# Patient Record
Sex: MALE | Race: WHITE | NOT HISPANIC OR LATINO | URBAN - NONMETROPOLITAN AREA
[De-identification: names, ages, dates, MRNs, and addresses within clinical notes are randomized per-mention and may not be internally consistent; named-entity substitution may affect disease eponyms.]

---

## 2024-03-25 PROBLEM — E78.5 HYPERLIPIDEMIA: Status: ACTIVE | Noted: 2024-03-25

## 2024-03-25 PROBLEM — E83.119 HEMOCHROMATOSIS: Status: ACTIVE | Noted: 2024-03-25

## 2024-03-25 PROBLEM — I25.10 CAD (CORONARY ARTERY DISEASE): Status: ACTIVE | Noted: 2024-03-25

## 2024-03-25 PROBLEM — I10 HYPERTENSION: Status: ACTIVE | Noted: 2024-03-25

## 2024-03-25 RX ORDER — LISINOPRIL AND HYDROCHLOROTHIAZIDE 12.5; 2 MG/1; MG/1
1 TABLET ORAL DAILY
COMMUNITY
Start: 2022-06-07

## 2024-03-25 RX ORDER — ASPIRIN 325 MG
1 TABLET, DELAYED RELEASE (ENTERIC COATED) ORAL DAILY
COMMUNITY

## 2024-03-25 RX ORDER — ASPIRIN 81 MG/1
1 TABLET ORAL DAILY
COMMUNITY

## 2024-03-25 RX ORDER — METOPROLOL TARTRATE 50 MG/1
1 TABLET ORAL EVERY 12 HOURS
COMMUNITY

## 2024-03-25 RX ORDER — OMEPRAZOLE 40 MG/1
40 CAPSULE, DELAYED RELEASE ORAL
COMMUNITY

## 2024-03-25 RX ORDER — ATORVASTATIN CALCIUM 20 MG/1
1 TABLET, FILM COATED ORAL NIGHTLY
COMMUNITY

## 2024-08-01 ENCOUNTER — APPOINTMENT (OUTPATIENT)
Dept: CARDIOLOGY | Facility: CLINIC | Age: 79
End: 2024-08-01
Payer: MEDICARE

## 2024-08-01 VITALS
BODY MASS INDEX: 25.77 KG/M2 | SYSTOLIC BLOOD PRESSURE: 138 MMHG | DIASTOLIC BLOOD PRESSURE: 82 MMHG | WEIGHT: 174 LBS | HEIGHT: 69 IN | HEART RATE: 90 BPM

## 2024-08-01 DIAGNOSIS — E78.2 MIXED HYPERLIPIDEMIA: ICD-10-CM

## 2024-08-01 DIAGNOSIS — I10 PRIMARY HYPERTENSION: ICD-10-CM

## 2024-08-01 DIAGNOSIS — I25.10 CORONARY ARTERY DISEASE INVOLVING NATIVE CORONARY ARTERY OF NATIVE HEART WITHOUT ANGINA PECTORIS: Primary | ICD-10-CM

## 2024-08-01 PROCEDURE — 1036F TOBACCO NON-USER: CPT | Performed by: NURSE PRACTITIONER

## 2024-08-01 PROCEDURE — 3075F SYST BP GE 130 - 139MM HG: CPT | Performed by: NURSE PRACTITIONER

## 2024-08-01 PROCEDURE — 3079F DIAST BP 80-89 MM HG: CPT | Performed by: NURSE PRACTITIONER

## 2024-08-01 PROCEDURE — 1160F RVW MEDS BY RX/DR IN RCRD: CPT | Performed by: NURSE PRACTITIONER

## 2024-08-01 PROCEDURE — 1159F MED LIST DOCD IN RCRD: CPT | Performed by: NURSE PRACTITIONER

## 2024-08-01 PROCEDURE — 99213 OFFICE O/P EST LOW 20 MIN: CPT | Performed by: NURSE PRACTITIONER

## 2024-08-01 RX ORDER — LISINOPRIL AND HYDROCHLOROTHIAZIDE 12.5; 2 MG/1; MG/1
1 TABLET ORAL DAILY
Qty: 90 TABLET | Refills: 3 | Status: SHIPPED | OUTPATIENT
Start: 2024-08-01 | End: 2025-08-01

## 2024-08-01 ASSESSMENT — ENCOUNTER SYMPTOMS
PND: 0
ORTHOPNEA: 0
NEAR-SYNCOPE: 0
IRREGULAR HEARTBEAT: 0
PALPITATIONS: 0
DYSPNEA ON EXERTION: 0
SYNCOPE: 0

## 2024-08-01 NOTE — PROGRESS NOTES
"Chief Complaint  \" So far so good\"    Reason for Visit  Annual follow-up  Patient presents to the office today for outpatient follow-up for coronary artery disease  Last evaluated in clinic by Dr. Smith August 2023    Presents today ambulatory with steady gait.  Accompanied by patient  Patient denies any hospitalizations or significant changes to interval medical history since last office follow-up.   He follows routinely with PCP and hematology due to hemochromatosis.    History of Present Illness   Patient is an extremely pleasant 79-year-old gentleman who presents to the office today with no voiced cardiovascular complaints.  He remains aerobically active mowing his grass with a self-reported lawn more, [place].  He denies any exertional symptoms, ambulated in the parking lot without concerns.    Patient reports that overall has no complaint(s) of chest pain, chest pressure/discomfort, claudication, dyspnea, exertional chest pressure/discomfort, fatigue, and irregular heart beat    Daily activity:    Rides his bicycle  Denies any change in exercise capacity or functional tolerance since last office visit.    The importance of secondary prevention reviewed:  HTN: Optimal  HLD: Treated  DM: Denies  Smoker: Denies  BMI:  Reviewed the merits of healthy lifestyle choices on overall cardiovascular health.    Overall patient is pleased with current state of cardiovascular health.  At this time there are no indications for additional cardiovascular testing or need for medication changes.     Review of Systems   Cardiovascular:  Negative for chest pain, dyspnea on exertion, irregular heartbeat, leg swelling, near-syncope, orthopnea, palpitations, paroxysmal nocturnal dyspnea and syncope.        Visit Vitals  /82 (BP Location: Left arm, Patient Position: Sitting)   Pulse 90   Ht 1.753 m (5' 9\")   Wt 78.9 kg (174 lb)   BMI 25.70 kg/m²   Smoking Status Former   BSA 1.96 m² "     Physical Exam  Vitals and nursing note reviewed.   Constitutional:       Appearance: Normal appearance.   Cardiovascular:      Rate and Rhythm: Normal rate and regular rhythm.      Heart sounds: Normal heart sounds.   Pulmonary:      Effort: Pulmonary effort is normal.      Breath sounds: Normal breath sounds.   Musculoskeletal:      Cervical back: Full passive range of motion without pain.      Right lower leg: No edema.      Left lower leg: No edema.   Skin:     General: Skin is cool.   Neurological:      Mental Status: He is alert and oriented to person, place, and time.   Psychiatric:         Attention and Perception: Attention normal.         Mood and Affect: Mood normal.         Behavior: Behavior is cooperative.      No Known Allergies    Current Outpatient Medications   Medication Instructions    aspirin 81 mg EC tablet 1 tablet, oral, Daily    atorvastatin (Lipitor) 20 mg tablet 1 tablet, oral, Nightly    cholecalciferol (Vitamin D-3) 50,000 unit capsule 1 capsule, oral, Daily    lisinopriL-hydrochlorothiazide 20-12.5 mg tablet 1 tablet, oral, Daily    metoprolol tartrate (Lopressor) 50 mg tablet 1 tablet, oral, Every 12 hours    omeprazole (PRILOSEC) 40 mg, oral, Daily before breakfast, Do not crush or chew.      Assessment:    CAD (coronary artery disease)  May 2015 cardiac cath  Mid LAD 50%  Circumflex normal  Mid RCA 50 to 70%  LVEF greater than 70%    Current daily activity greater than 4 METS without concerning symptoms    Hyperlipidemia  Treated    Hypertension  Optimal in office    BMI 25.0-25.9,adult  Reviewed the merits of healthy lifestyle choices on overall cardiovascular health.    Plan:     Through informed decision making process incorporating patients unique circumstances, the following treatment plan will be initiated:    1.  Prescription drug management of cardiovascular medication for efficacy, adherence to treatment, side effect assessment and polypharmacy. Current treatment  clinically warranted and to continue without modifications.    2. Return for follow-up; in the interim, contact the office if new symptoms arise.  Dr. Cody annual     Discussed the dynamic nature of coronary artery disease and the importance of seeking medical attention if new symptoms arise.    Abida Martin  MSN, APRN-CNP, PMHNP-BC  [facility]    Please excuse any errors in grammar or translation related to this dictation. Voice recognition software was utilized to prepare this document.

## 2024-08-01 NOTE — ASSESSMENT & PLAN NOTE
May 2015 cardiac cath  Mid LAD 50%  Circumflex normal  Mid RCA 50 to 70%  LVEF greater than 70%    Current daily activity greater than 4 METS without concerning symptoms

## 2024-08-01 NOTE — PATIENT INSTRUCTIONS
Please bring all medicines, vitamins, and herbal supplements with you when you come to the office.    Prescriptions will not be filled unless you are compliant with your follow up appointments or have a follow up appointment scheduled as per instruction of your physician. Refills should be requested at the time of your visit.     PLAN:   Through informed decision making process incorporating patients unique circumstances, the following treatment plan will be initiated:    1.  Prescription drug management of cardiovascular medication for efficacy, adherence to treatment, side effect assessment and polypharmacy. Current treatment clinically warranted and to continue without modifications.    2. Return for follow-up; in the interim, contact the office if new symptoms arise.  Dr. Cody annual

## 2025-08-19 ENCOUNTER — APPOINTMENT (OUTPATIENT)
Dept: CARDIOLOGY | Facility: CLINIC | Age: 80
End: 2025-08-19
Payer: MEDICARE

## 2025-08-19 VITALS
HEART RATE: 66 BPM | WEIGHT: 154 LBS | SYSTOLIC BLOOD PRESSURE: 100 MMHG | BODY MASS INDEX: 22.81 KG/M2 | DIASTOLIC BLOOD PRESSURE: 62 MMHG | HEIGHT: 69 IN

## 2025-08-19 DIAGNOSIS — E78.2 MIXED HYPERLIPIDEMIA: ICD-10-CM

## 2025-08-19 DIAGNOSIS — I10 PRIMARY HYPERTENSION: ICD-10-CM

## 2025-08-19 DIAGNOSIS — Z76.89 ENCOUNTER TO ESTABLISH CARE: ICD-10-CM

## 2025-08-19 DIAGNOSIS — I25.10 CORONARY ARTERY DISEASE INVOLVING NATIVE CORONARY ARTERY OF NATIVE HEART WITHOUT ANGINA PECTORIS: Primary | ICD-10-CM

## 2025-08-19 DIAGNOSIS — E83.119 HEMOCHROMATOSIS, UNSPECIFIED HEMOCHROMATOSIS TYPE: ICD-10-CM

## 2025-08-19 PROCEDURE — 93000 ELECTROCARDIOGRAM COMPLETE: CPT | Performed by: INTERNAL MEDICINE

## 2025-08-19 PROCEDURE — 1160F RVW MEDS BY RX/DR IN RCRD: CPT | Performed by: INTERNAL MEDICINE

## 2025-08-19 PROCEDURE — 1159F MED LIST DOCD IN RCRD: CPT | Performed by: INTERNAL MEDICINE

## 2025-08-19 PROCEDURE — 99214 OFFICE O/P EST MOD 30 MIN: CPT | Performed by: INTERNAL MEDICINE

## 2025-08-19 PROCEDURE — 3074F SYST BP LT 130 MM HG: CPT | Performed by: INTERNAL MEDICINE

## 2025-08-19 PROCEDURE — 3078F DIAST BP <80 MM HG: CPT | Performed by: INTERNAL MEDICINE

## 2025-08-19 RX ORDER — AMLODIPINE BESYLATE 2.5 MG/1
2.5 TABLET ORAL DAILY
COMMUNITY

## 2026-08-18 ENCOUNTER — APPOINTMENT (OUTPATIENT)
Dept: CARDIOLOGY | Facility: CLINIC | Age: 81
End: 2026-08-18
Payer: MEDICARE